# Patient Record
Sex: FEMALE | Race: WHITE
[De-identification: names, ages, dates, MRNs, and addresses within clinical notes are randomized per-mention and may not be internally consistent; named-entity substitution may affect disease eponyms.]

---

## 2017-01-25 ENCOUNTER — HOSPITAL ENCOUNTER (EMERGENCY)
Dept: HOSPITAL 80 - CED | Age: 57
Discharge: HOME | End: 2017-01-25
Payer: COMMERCIAL

## 2017-01-25 VITALS
DIASTOLIC BLOOD PRESSURE: 93 MMHG | SYSTOLIC BLOOD PRESSURE: 145 MMHG | TEMPERATURE: 98.6 F | OXYGEN SATURATION: 96 % | RESPIRATION RATE: 20 BRPM | HEART RATE: 106 BPM

## 2017-01-25 DIAGNOSIS — J10.1: Primary | ICD-10-CM

## 2017-01-25 NOTE — UCP[facility]
H & P


Time Seen by Provider: 01/25/17 08:41


Patient Type: New


HPI/ROS: 


This patient presents with a chief complaint of productive cough and severe 

with nasal and facial congestion for the past 4 days.  Early on in the illness 

she had a fever as high as 101.9 but has had no fever  yesterday or today.  She 

has myalgias primarily involving her back with only mild headache. She 

complains of bilateral ear pain but denies sore throat. She has been coughing a 

lot and believes that she has been wheezing as well the cough is productive.  

She describes chest tightness involving the entire precordium.  The patient has 

had a flu shot.





REVIEW OF SYSTEMS:


Constitutional:  Malaise, fatigue, early fever now resolved


Eyes:  No complaints


ENT:  Bilateral ear pain, nasal congestion, no sore throat


Respiratory:  Productive cough, mild dyspnea on exertion


Cardiac:  Chest pressure


Gastrointestinal:  Not addressed


Genitourinary:  Not addressed


Musculoskeletal:  Myalgias involving primarily the back and hips.


Skin:  No rash


Neurological:  Mild headache


Physical Exam: 


GENERAL:  Well-appearing, well-nourished and in no acute distress.


HEAD:  Atraumatic, normocephalic. There is no tenderness to percussion over the 

frontal and maxillary sinuses


EYES:   sclera anicteric, conjunctiva are normal.


ENT:  TMs normal, nares congested, oropharynx clear without exudates.  Moist 

mucous membranes.


NECK:  Normal range of motion, supple without there is a small enlarged lymph 

node in the right submental region.  Carotid pulsations symmetric


LUNGS:  Breath sounds clear to auscultation bilaterally and equal.  No wheezes 

rales or rhonchi.


HEART:  Regular rate and rhythm without murmurs, rubs or gallops.


EXTREMITIES:  Normal range of motion,


NEUROLOGICAL:  Cranial nerves II through XII grossly intact.  Normal speech, 

normal gait.


PSYCH:  Normal mood, normal affect.


SKIN:  Warm, dry, normal turgor, no visible rashes or lesions.


Constitutional: 


 Initial Vital Signs











Temperature (C)  37.0 C   01/25/17 08:50


 


Heart Rate  106 H  01/25/17 08:50


 


Respiratory Rate  20   01/25/17 08:50


 


Blood Pressure  145/93 H  01/25/17 08:50


 


O2 Sat (%)  96   01/25/17 08:50








 











O2 Delivery Mode               Room Air














Allergies/Adverse Reactions: 


 





codeine [Codeine] Allergy (Mild, Verified 01/25/17 08:38)


 


Penicillins Allergy (Mild, Verified 01/25/17 08:38)


 


Sulfa (Sulfonamide Antibiotics) Allergy (Mild, Verified 01/25/17 08:38)


 


promethazine HCl [From Phenergan] Allergy (Verified 01/25/17 08:38)


 Vomiting








Home Medications: 














 Medication  Instructions  Recorded


 


Estrogen Patch  01/23/14














Medical Decision Making


Differential Diagnosis: 


This patient's symptoms are consistent with influenza  Although they are not as 

severe as some.  I believe that her symptoms have been attenuated by her 

influenza immunization.  I find no evidence of pneumonia or any bacterial 

complication.





- Data Points


Laboratory Results: 


 











  01/25/17





  08:40


 


Influenza Typ A,B (DFA)  POSITIVE FOR FLU A H 





   (NEGATIVE) 














Departure





- Departure


Disposition: Home, Routine, Self-Care


Clinical Impression: 


 Influenza A


Condition: Good


Instructions:  Influenza (ED)


Additional Instructions: 


Influenza generally last 7 days if your ill for more than 9 or 10 days you 

should be re-evaluated.  If you feel that your symptoms are worsening you 

should be re-evaluated sooner.


Try a nasal decongestant spray such as Afrin.  Keep herself well hydrated but 

do not force herself to eat.





Adult Pain & Fever Control:


We recommend Acetaminophen (Tylenol) and Ibuprofen (Motrin, Advil) for pain and 

fever control.  When fever is high or pain severe, both drugs can be used at 

the same time, but at different intervals.  Please note the time differences.


Your dose is:  Acetaminophen [650]mg every 4 to 6 hours ibuprofen [600]mg every 

[6] hours with food OR naproxen Sodium (Aleve) [440]mg every 12 hours.





Note:  do not take Acetaminophen with Hydrocodone (Vicodin, Lortab) or 

Oxycodone (Percocet).  These medications also contain Acetaminophen.





No more than 3000 mg of Acetaminophen should be taken in 24 hours (for an adult)

.





The maximal dose of ibuprofen that it is safe in a 24-hour period is 2400 mg.  

You may take 400 mg every 4 hours, 600 mg every 6 hours or 800 mg every 8 hours 

safely.


Referrals: 


Teresa Barcenas MD [Primary Care Provider] - As per Instructions





- PQRS


PQRS Measurement: 


Not applicable

## 2018-05-18 ENCOUNTER — HOSPITAL ENCOUNTER (OUTPATIENT)
Dept: HOSPITAL 80 - FIMAGING | Age: 58
End: 2018-05-18
Attending: FAMILY MEDICINE
Payer: COMMERCIAL

## 2018-05-18 DIAGNOSIS — Z12.31: Primary | ICD-10-CM
